# Patient Record
(demographics unavailable — no encounter records)

---

## 2025-03-03 NOTE — HISTORY OF PRESENT ILLNESS
[de-identified] : Mr. Garcia is an 82 year old male with a PMH of CVA, afib on Xarelto that presents for initial consultation referred by Dr. Brown for CHANELLE. \par  \par  He was initially referred to Dr. Brown for further work up of CHANELLE by Dr. Wilder. He was noted to have guaiac negative brown stool. \par  \par  He is s/p CNY 10/2016 revealing hemorrhoids and diverticulosis\par  \par  2/2023 Labs: \par  Ferritin 16\par  % sat 20 \par  b12 579 \par  hgb 12.8\par  \par  3/2023 \par  Ferritin 16 \par  %sat 24 \par  hgb 12.9\par  \par  He was recommended for EGD/CNY/Capsule Endoscopy following eval with Dr. Brown. \par  \par  He is s/p EGD/CNY 5/25/23 pathology revealing: \par  A. DUODENUM, BIOPSY:\par  - Intestinal mucosa with unremarkable villous morphology and epithelium.\par  B. STOMACH ANTRUM, BIOPSY:\par  - Gastric mucosa with foveolar hyperplasia and mild chronic inactive gastritis.\par  - No Helicobacter Pylori like organisms identified on special stain.\par  - No intestinal metaplasia or dysplasia seen.\par  C. DISTAL ESOPHAGUS, BIOPSY:\par  - Esophageal squamous mucosa showing marked chronic esophagitis with abundant\par  intraepithelial eosinophils (up to 30 eosinophils per  HPF) and hyperplastic changes.\par  D. RECTAL POLYP, BIOPSY:\par  - Tubular adenoma.\par   [de-identified] : Patient seen and examined and here today for follow up. He started on new BP med. Feels tired. Had fainting episode 2 weeks ago, no headstrike. Following up with Cardio for possible EKG/ECHO. Has not required irons recently.

## 2025-03-03 NOTE — HISTORY OF PRESENT ILLNESS
[de-identified] : Mr. Garcia is an 82 year old male with a PMH of CVA, afib on Xarelto that presents for initial consultation referred by Dr. Brown for CHANELLE. \par  \par  He was initially referred to Dr. Brown for further work up of CHANELLE by Dr. Wilder. He was noted to have guaiac negative brown stool. \par  \par  He is s/p CNY 10/2016 revealing hemorrhoids and diverticulosis\par  \par  2/2023 Labs: \par  Ferritin 16\par  % sat 20 \par  b12 579 \par  hgb 12.8\par  \par  3/2023 \par  Ferritin 16 \par  %sat 24 \par  hgb 12.9\par  \par  He was recommended for EGD/CNY/Capsule Endoscopy following eval with Dr. Brown. \par  \par  He is s/p EGD/CNY 5/25/23 pathology revealing: \par  A. DUODENUM, BIOPSY:\par  - Intestinal mucosa with unremarkable villous morphology and epithelium.\par  B. STOMACH ANTRUM, BIOPSY:\par  - Gastric mucosa with foveolar hyperplasia and mild chronic inactive gastritis.\par  - No Helicobacter Pylori like organisms identified on special stain.\par  - No intestinal metaplasia or dysplasia seen.\par  C. DISTAL ESOPHAGUS, BIOPSY:\par  - Esophageal squamous mucosa showing marked chronic esophagitis with abundant\par  intraepithelial eosinophils (up to 30 eosinophils per  HPF) and hyperplastic changes.\par  D. RECTAL POLYP, BIOPSY:\par  - Tubular adenoma.\par   [de-identified] : Patient seen and examined and here today for follow up. He started on new BP med. Feels tired. Had fainting episode 2 weeks ago, no headstrike. Following up with Cardio for possible EKG/ECHO. Has not required irons recently.  independent

## 2025-03-03 NOTE — ASSESSMENT
[FreeTextEntry1] : #Anemia  - Likely 2/2 CHANELLE - s/p EGD/CNY 5/25/23 pathology unrevealing for cause of bleeding. DW Dr. Brown. He will arrange for a capsule endoscopy. - s/p venofer x 5 doses - Reviewed blood work - Discussed that hgb 11.8 today. discussed that since hgb dropped and iron was supplemented may consider BM bx to r/o underlying BM d/o - hypo proliferation noted - no evidence of hemolysis - b12 and folate wnl - Elevated k and lambda light chains but no abnormality in ratio or on HELLEN - Initially ESR and CRP WNL and now ESR slightly elevated - may have element of inflammation TSH wnl - Has had no hematopoietic growth factors in the prior 6 months. No diagnosis of a solid tumor or hematologic malignancy with in the past 2 years. No treatment with radiation therapy within 2 years. No non-hormonal treatment for malignancy within the two years priori to registration. No hereditary bone marrow failure syndrome. No known primary diagnosis of aplastic anemia, classical paroxysmal nocturnal hemoglobinuria, amegakaryocytic thrombocytopenic purpura or LGL. - [facility]: The National Myelodysplastic Syndromes (MDS) Study to patient and healthcare proxies. Answered all questions asked with no further questions at this time. Introduced Luann Petty Research Nurse to review study and consent in greater detail. Patient given ample time to review consent. Patient signed consent, along with [facility] HIPAA consent and fully signed copies of each given to patient Introduced [facility]: The National Myelodysplastic Syndromes (MDS) Study to patient and healthcare proxies. Answered all questions asked with no further questions at this time. Introduced Luann Petty Research Nurse to review study and consent in greater detail. Patient and healthcare proxies given ample time to review consent. Patient signed consent, along with [facility] HIPAA consent and fully signed copies of each given to patient - Patient is not enrolled in the Connect MDS/AML disease registry. - 10/6/23 - BM Biopsy results reviewed - cellular marrow with erythroid predominant maturing trilineage hematopoeisis with no increased blast population. NGS negative, cytogenetics normal. hgb 13.5, plts 144 -wbc 6.2 continue to monitor counts. - 1/29/24- vs and labs reviewed. cbc wnl. will continue to monitor. iron wnl. pending ferritin - 7/8/24 vs and CBC reviewed; WBC 7.66, hgb 16.3, plt 186. Repeat irons today   #h/o stroke and afib - On xarelto - no active bleeding currently - 7/8/24 states he had a fainting episode 2 weeks ago without head strike. Has follow up with cardio. Recently on new BP med. He does of hx of afib. He will be going to cardio for possible EKG or ECHO   RTC in 5-6 months with cbc with diff, cmp, iron, ferritini, b12, folate, ESR/CRP, retic, epo.  Case and management discussed with Dr. Rodriguez

## 2025-03-03 NOTE — ASSESSMENT
[FreeTextEntry1] : #Anemia  - Likely 2/2 CHANELLE - s/p EGD/CNY 5/25/23 pathology unrevealing for cause of bleeding. DW Dr. Brown. He will arrange for a capsule endoscopy. - s/p venofer x 5 doses - Reviewed blood work - Discussed that hgb 11.8 today. discussed that since hgb dropped and iron was supplemented may consider BM bx to r/o underlying BM d/o - hypo proliferation noted - no evidence of hemolysis - b12 and folate wnl - Elevated k and lambda light chains but no abnormality in ratio or on HELLEN - Initially ESR and CRP WNL and now ESR slightly elevated - may have element of inflammation TSH wnl - Has had no hematopoietic growth factors in the prior 6 months. No diagnosis of a solid tumor or hematologic malignancy with in the past 2 years. No treatment with radiation therapy within 2 years. No non-hormonal treatment for malignancy within the two years priori to registration. No hereditary bone marrow failure syndrome. No known primary diagnosis of aplastic anemia, classical paroxysmal nocturnal hemoglobinuria, amegakaryocytic thrombocytopenic purpura or LGL. - [facility]ced [facility]: The National Myelodysplastic Syndromes (MDS) Study to patient and healthcare proxies. Answered all questions asked with no further questions at this time. Introduced Luann Petty Research Nurse to review study and consent in greater detail. Patient given ample time to review consent. Patient signed consent, along with [facility] HIPAA consent and fully signed copies of each given to patient Introduced [facility]: The National Myelodysplastic Syndromes (MDS) Study to patient and healthcare proxies. Answered all questions asked with no further questions at this time. Introduced Luann Petty Research Nurse to review study and consent in greater detail. Patient and healthcare proxies given ample time to review consent. Patient signed consent, along with [facility] HIPAA consent and fully signed copies of each given to patient - Patient is not enrolled in the Connect MDS/AML disease registry. - 10/6/23 - BM Biopsy results reviewed - cellular marrow with erythroid predominant maturing trilineage hematopoeisis with no increased blast population. NGS negative, cytogenetics normal. hgb 13.5, plts 144 -wbc 6.2 continue to monitor counts. - 1/29/24- vs and labs reviewed. cbc wnl. will continue to monitor. iron wnl. pending ferritin - 7/8/24 vs and CBC reviewed; WBC 7.66, hgb 16.3, plt 186. Repeat irons today   #h/o stroke and afib - On xarelto - no active bleeding currently - 7/8/24 states he had a fainting episode 2 weeks ago without head strike. Has follow up with cardio. Recently on new BP med. He does of hx of afib. He will be going to cardio for possible EKG or ECHO   RTC in 5-6 months with cbc with diff, cmp, iron, ferritini, b12, folate, ESR/CRP, retic, epo.  Case and management discussed with Dr. Rodriguez

## 2025-03-31 NOTE — ASSESSMENT
[FreeTextEntry1] : #Anemia  - Likely 2/2 CHANELLE - s/p EGD/CNY 5/25/23 pathology unrevealing for cause of bleeding. DW Dr. Brown. He will arrange for a capsule endoscopy. - s/p venofer x 5 doses - Reviewed blood work - Discussed that hgb 11.8 today. discussed that since hgb dropped and iron was supplemented may consider BM bx to r/o underlying BM d/o - hypo proliferation noted - no evidence of hemolysis - b12 and folate wnl - Elevated k and lambda light chains but no abnormality in ratio or on HELLEN - Initially ESR and CRP WNL and now ESR slightly elevated - may have element of inflammation TSH wnl - Has had no hematopoietic growth factors in the prior 6 months. No diagnosis of a solid tumor or hematologic malignancy with in the past 2 years. No treatment with radiation therapy within 2 years. No non-hormonal treatment for malignancy within the two years priori to registration. No hereditary bone marrow failure syndrome. No known primary diagnosis of aplastic anemia, classical paroxysmal nocturnal hemoglobinuria, amegakaryocytic thrombocytopenic purpura or LGL. - [facility]: The National Myelodysplastic Syndromes (MDS) Study to patient and healthcare proxies. Answered all questions asked with no further questions at this time. Introduced Luann Petty Research Nurse to review study and consent in greater detail. Patient given ample time to review consent. Patient signed consent, along with [facility] HIPAA consent and fully signed copies of each given to patient [facility]ced [facility]: The National Myelodysplastic Syndromes (MDS) Study to patient and healthcare proxies. Answered all questions asked with no further questions at this time. Introduced Luann Petty Research Nurse to review study and consent in greater detail. Patient and healthcare proxies given ample time to review consent. Patient signed consent, along with [facility] HIPAA consent and fully signed copies of each given to patient - Patient is not enrolled in the Connect MDS/AML disease registry. - 10/6/23 - BM Biopsy results reviewed - cellular marrow with erythroid predominant maturing trilineage hematopoeisis with no increased blast population. NGS negative, cytogenetics normal. hgb 13.5, plts 144 -wbc 6.2 continue to monitor counts. - 1/29/24- vs and labs reviewed. cbc wnl. will continue to monitor. iron wnl. pending ferritin - 7/8/24 vs and CBC reviewed; WBC 7.66, hgb 16.3, plt 186. Repeat irons today   #h/o stroke and afib - On xarelto - no active bleeding currently - 7/8/24 states he had a fainting episode 2 weeks ago without head strike. Has follow up with cardio. Recently on new BP med. He does of hx of afib. He will be going to cardio for possible EKG or ECHO   RTC in 5-6 months with cbc with diff, cmp, iron, ferritini, b12, folate, ESR/CRP, retic, epo.  Case and management discussed with Dr. Rodriguez

## 2025-03-31 NOTE — ASSESSMENT
[FreeTextEntry1] : #Anemia  - Likely 2/2 CHANELLE - s/p EGD/CNY 5/25/23 pathology unrevealing for cause of bleeding. DW Dr. Brown. He will arrange for a capsule endoscopy. - s/p venofer x 5 doses - Reviewed blood work - Discussed that hgb 11.8 today. discussed that since hgb dropped and iron was supplemented may consider BM bx to r/o underlying BM d/o - hypo proliferation noted - no evidence of hemolysis - b12 and folate wnl - Elevated k and lambda light chains but no abnormality in ratio or on HELLEN - Initially ESR and CRP WNL and now ESR slightly elevated - may have element of inflammation TSH wnl - Has had no hematopoietic growth factors in the prior 6 months. No diagnosis of a solid tumor or hematologic malignancy with in the past 2 years. No treatment with radiation therapy within 2 years. No non-hormonal treatment for malignancy within the two years priori to registration. No hereditary bone marrow failure syndrome. No known primary diagnosis of aplastic anemia, classical paroxysmal nocturnal hemoglobinuria, amegakaryocytic thrombocytopenic purpura or LGL. - [facility]: The National Myelodysplastic Syndromes (MDS) Study to patient and healthcare proxies. Answered all questions asked with no further questions at this time. Introduced Luann Petty Research Nurse to review study and consent in greater detail. Patient given ample time to review consent. Patient signed consent, along with [facility] HIPAA consent and fully signed copies of each given to patient [facility]ced [facility]: The National Myelodysplastic Syndromes (MDS) Study to patient and healthcare proxies. Answered all questions asked with no further questions at this time. Introduced Luann Petty Research Nurse to review study and consent in greater detail. Patient and healthcare proxies given ample time to review consent. Patient signed consent, along with [facility] HIPAA consent and fully signed copies of each given to patient - Patient is not enrolled in the Connect MDS/AML disease registry. - 10/6/23 - BM Biopsy results reviewed - cellular marrow with erythroid predominant maturing trilineage hematopoeisis with no increased blast population. NGS negative, cytogenetics normal. hgb 13.5, plts 144 -wbc 6.2 continue to monitor counts. - 1/29/24- vs and labs reviewed. cbc wnl. will continue to monitor. iron wnl. pending ferritin - 7/8/24 vs and CBC reviewed; WBC 7.66, hgb 16.3, plt 186. Repeat irons today   #h/o stroke and afib - On xarelto - no active bleeding currently - 7/8/24 states he had a fainting episode 2 weeks ago without head strike. Has follow up with cardio. Recently on new BP med. He does of hx of afib. He will be going to cardio for possible EKG or ECHO   RTC in 5-6 months with cbc with diff, cmp, iron, ferritini, b12, folate, ESR/CRP, retic, epo.  Case and management discussed with Dr. Rodriguez

## 2025-03-31 NOTE — HISTORY OF PRESENT ILLNESS
[de-identified] : Mr. Garcia is an 82 year old male with a PMH of CVA, afib on Xarelto that presents for initial consultation referred by Dr. Brown for CHANELLE. \par  \par  He was initially referred to Dr. Brown for further work up of CHANELLE by Dr. Wilder. He was noted to have guaiac negative brown stool. \par  \par  He is s/p CNY 10/2016 revealing hemorrhoids and diverticulosis\par  \par  2/2023 Labs: \par  Ferritin 16\par  % sat 20 \par  b12 579 \par  hgb 12.8\par  \par  3/2023 \par  Ferritin 16 \par  %sat 24 \par  hgb 12.9\par  \par  He was recommended for EGD/CNY/Capsule Endoscopy following eval with Dr. Brown. \par  \par  He is s/p EGD/CNY 5/25/23 pathology revealing: \par  A. DUODENUM, BIOPSY:\par  - Intestinal mucosa with unremarkable villous morphology and epithelium.\par  B. STOMACH ANTRUM, BIOPSY:\par  - Gastric mucosa with foveolar hyperplasia and mild chronic inactive gastritis.\par  - No Helicobacter Pylori like organisms identified on special stain.\par  - No intestinal metaplasia or dysplasia seen.\par  C. DISTAL ESOPHAGUS, BIOPSY:\par  - Esophageal squamous mucosa showing marked chronic esophagitis with abundant\par  intraepithelial eosinophils (up to 30 eosinophils per  HPF) and hyperplastic changes.\par  D. RECTAL POLYP, BIOPSY:\par  - Tubular adenoma.\par   [de-identified] : Patient seen and examined and here today for follow up. He started on new BP med. Feels tired. Had fainting episode 2 weeks ago, no headstrike. Following up with Cardio for possible EKG/ECHO. Has not required irons recently.

## 2025-03-31 NOTE — HISTORY OF PRESENT ILLNESS
[de-identified] : Mr. Garcia is an 82 year old male with a PMH of CVA, afib on Xarelto that presents for initial consultation referred by Dr. Brown for CHANELLE. \par  \par  He was initially referred to Dr. Brown for further work up of CHANELLE by Dr. Wilder. He was noted to have guaiac negative brown stool. \par  \par  He is s/p CNY 10/2016 revealing hemorrhoids and diverticulosis\par  \par  2/2023 Labs: \par  Ferritin 16\par  % sat 20 \par  b12 579 \par  hgb 12.8\par  \par  3/2023 \par  Ferritin 16 \par  %sat 24 \par  hgb 12.9\par  \par  He was recommended for EGD/CNY/Capsule Endoscopy following eval with Dr. Brown. \par  \par  He is s/p EGD/CNY 5/25/23 pathology revealing: \par  A. DUODENUM, BIOPSY:\par  - Intestinal mucosa with unremarkable villous morphology and epithelium.\par  B. STOMACH ANTRUM, BIOPSY:\par  - Gastric mucosa with foveolar hyperplasia and mild chronic inactive gastritis.\par  - No Helicobacter Pylori like organisms identified on special stain.\par  - No intestinal metaplasia or dysplasia seen.\par  C. DISTAL ESOPHAGUS, BIOPSY:\par  - Esophageal squamous mucosa showing marked chronic esophagitis with abundant\par  intraepithelial eosinophils (up to 30 eosinophils per  HPF) and hyperplastic changes.\par  D. RECTAL POLYP, BIOPSY:\par  - Tubular adenoma.\par   [de-identified] : Patient seen and examined and here today for follow up. He started on new BP med. Feels tired. Had fainting episode 2 weeks ago, no headstrike. Following up with Cardio for possible EKG/ECHO. Has not required irons recently.

## 2025-05-29 NOTE — HISTORY OF PRESENT ILLNESS
[de-identified] : Mr. Garcia is an 82 year old male with a PMH of CVA, afib on Xarelto that presents for initial consultation referred by Dr. Brown for CHANELLE. \par  \par  He was initially referred to Dr. Brown for further work up of CHANELLE by Dr. Wilder. He was noted to have guaiac negative brown stool. \par  \par  He is s/p CNY 10/2016 revealing hemorrhoids and diverticulosis\par  \par  2/2023 Labs: \par  Ferritin 16\par  % sat 20 \par  b12 579 \par  hgb 12.8\par  \par  3/2023 \par  Ferritin 16 \par  %sat 24 \par  hgb 12.9\par  \par  He was recommended for EGD/CNY/Capsule Endoscopy following eval with Dr. Brown. \par  \par  He is s/p EGD/CNY 5/25/23 pathology revealing: \par  A. DUODENUM, BIOPSY:\par  - Intestinal mucosa with unremarkable villous morphology and epithelium.\par  B. STOMACH ANTRUM, BIOPSY:\par  - Gastric mucosa with foveolar hyperplasia and mild chronic inactive gastritis.\par  - No Helicobacter Pylori like organisms identified on special stain.\par  - No intestinal metaplasia or dysplasia seen.\par  C. DISTAL ESOPHAGUS, BIOPSY:\par  - Esophageal squamous mucosa showing marked chronic esophagitis with abundant\par  intraepithelial eosinophils (up to 30 eosinophils per  HPF) and hyperplastic changes.\par  D. RECTAL POLYP, BIOPSY:\par  - Tubular adenoma.\par   [de-identified] : Patient seen and examined and here today for follow up. had several strokes and balance issues. Son was very sick after a motocycle accident

## 2025-05-29 NOTE — HISTORY OF PRESENT ILLNESS
[de-identified] : Mr. Garcia is an 82 year old male with a PMH of CVA, afib on Xarelto that presents for initial consultation referred by Dr. Brown for CHANELLE. \par  \par  He was initially referred to Dr. Brown for further work up of CHANELLE by Dr. Wilder. He was noted to have guaiac negative brown stool. \par  \par  He is s/p CNY 10/2016 revealing hemorrhoids and diverticulosis\par  \par  2/2023 Labs: \par  Ferritin 16\par  % sat 20 \par  b12 579 \par  hgb 12.8\par  \par  3/2023 \par  Ferritin 16 \par  %sat 24 \par  hgb 12.9\par  \par  He was recommended for EGD/CNY/Capsule Endoscopy following eval with Dr. Brown. \par  \par  He is s/p EGD/CNY 5/25/23 pathology revealing: \par  A. DUODENUM, BIOPSY:\par  - Intestinal mucosa with unremarkable villous morphology and epithelium.\par  B. STOMACH ANTRUM, BIOPSY:\par  - Gastric mucosa with foveolar hyperplasia and mild chronic inactive gastritis.\par  - No Helicobacter Pylori like organisms identified on special stain.\par  - No intestinal metaplasia or dysplasia seen.\par  C. DISTAL ESOPHAGUS, BIOPSY:\par  - Esophageal squamous mucosa showing marked chronic esophagitis with abundant\par  intraepithelial eosinophils (up to 30 eosinophils per  HPF) and hyperplastic changes.\par  D. RECTAL POLYP, BIOPSY:\par  - Tubular adenoma.\par   [de-identified] : Patient seen and examined and here today for follow up. had several strokes and balance issues. Son was very sick after a motocycle accident

## 2025-05-29 NOTE — ASSESSMENT
[FreeTextEntry1] : #Anemia  - Likely 2/2 CHANELLE - s/p EGD/CNY 5/25/23 pathology unrevealing for cause of bleeding. DW Dr. Brown. He will arrange for a capsule endoscopy. - s/p venofer x 5 doses - Enrolled in MDS study- 10/6/23 - BM Biopsy results reviewed - cellular marrow with erythroid predominant maturing trilineage hematopoeisis with no increased blast population. NGS negative, cytogenetics normal. -hgb 12.9 - at this point would just continue to monitor. He states he has too many doctors. Recommend follow up with Dr. Wilder  #h/o stroke and afib - On xarelto - no active bleeding currently  RTC PRN and will have labs checked by Meño Brown

## 2025-05-29 NOTE — HISTORY OF PRESENT ILLNESS
[de-identified] : Mr. Garcia is an 82 year old male with a PMH of CVA, afib on Xarelto that presents for initial consultation referred by Dr. rBown for CHANELLE. \par  \par  He was initially referred to Dr. Brown for further work up of CHANELLE by Dr. Wilder. He was noted to have guaiac negative brown stool. \par  \par  He is s/p CNY 10/2016 revealing hemorrhoids and diverticulosis\par  \par  2/2023 Labs: \par  Ferritin 16\par  % sat 20 \par  b12 579 \par  hgb 12.8\par  \par  3/2023 \par  Ferritin 16 \par  %sat 24 \par  hgb 12.9\par  \par  He was recommended for EGD/CNY/Capsule Endoscopy following eval with Dr. Brown. \par  \par  He is s/p EGD/CNY 5/25/23 pathology revealing: \par  A. DUODENUM, BIOPSY:\par  - Intestinal mucosa with unremarkable villous morphology and epithelium.\par  B. STOMACH ANTRUM, BIOPSY:\par  - Gastric mucosa with foveolar hyperplasia and mild chronic inactive gastritis.\par  - No Helicobacter Pylori like organisms identified on special stain.\par  - No intestinal metaplasia or dysplasia seen.\par  C. DISTAL ESOPHAGUS, BIOPSY:\par  - Esophageal squamous mucosa showing marked chronic esophagitis with abundant\par  intraepithelial eosinophils (up to 30 eosinophils per  HPF) and hyperplastic changes.\par  D. RECTAL POLYP, BIOPSY:\par  - Tubular adenoma.\par   [de-identified] : Patient seen and examined and here today for follow up. had several strokes and balance issues. Son was very sick after a motocycle accident